# Patient Record
Sex: FEMALE | Race: WHITE | NOT HISPANIC OR LATINO | URBAN - METROPOLITAN AREA
[De-identification: names, ages, dates, MRNs, and addresses within clinical notes are randomized per-mention and may not be internally consistent; named-entity substitution may affect disease eponyms.]

---

## 2020-11-21 ENCOUNTER — EMERGENCY (EMERGENCY)
Facility: HOSPITAL | Age: 65
LOS: 1 days | Discharge: HOME | End: 2020-11-21
Attending: STUDENT IN AN ORGANIZED HEALTH CARE EDUCATION/TRAINING PROGRAM
Payer: MEDICARE

## 2020-11-21 VITALS
OXYGEN SATURATION: 100 % | RESPIRATION RATE: 20 BRPM | HEIGHT: 67 IN | TEMPERATURE: 98 F | DIASTOLIC BLOOD PRESSURE: 75 MMHG | SYSTOLIC BLOOD PRESSURE: 134 MMHG | HEART RATE: 67 BPM | WEIGHT: 162.04 LBS

## 2020-11-21 PROCEDURE — 93971 EXTREMITY STUDY: CPT | Mod: 26,LT

## 2020-11-21 PROCEDURE — 99284 EMERGENCY DEPT VISIT MOD MDM: CPT

## 2020-11-21 PROCEDURE — 93971 EXTREMITY STUDY: CPT

## 2020-11-21 PROCEDURE — 73630 X-RAY EXAM OF FOOT: CPT | Mod: 26,LT

## 2020-11-21 PROCEDURE — 99284 EMERGENCY DEPT VISIT MOD MDM: CPT | Mod: 25

## 2020-11-21 PROCEDURE — 73630 X-RAY EXAM OF FOOT: CPT

## 2020-11-21 NOTE — ED ADULT NURSE NOTE - OBJECTIVE STATEMENT
Pt c/o left foot pain x3 weeks, she states he had 2 runs of abx as per urgent care and pain continues. Mild redness and swelling noted, pedal pulses, weak but palpable. Pt has a hx of osteoarthritis and HLD. No other pertinent hx.

## 2020-11-21 NOTE — ED PROVIDER NOTE - PHYSICAL EXAMINATION
Constitutional - well-developed; well nourished. Head - NCAT. Airway patent. Eyes - PERRL. CV - RRR. no murmur. no edema. Pulm - CTAB. Abd - soft, nt. no rebound. no guarding. Neuro - A&Ox3. normal gait. Skin - No rash. MSK - normal ROM. L foot with mild ttp over mid lateral foot.  minimal erythema. no warmth.

## 2020-11-21 NOTE — ED PROVIDER NOTE - OBJECTIVE STATEMENT
Pt is a 64 yo F co L foot pain.  Pt states that 3 weeks ago she started to have pain and swelling to the L foot. Pt states that she went to an UC and was told it was an infection and started on abx. Pt states that it did not improve so a few days later she went to another UC and was switched to a stronger antibiotic. Pt states that the swelling has improved but the pain persists. Pt states that she is able to walk on it and certain shoes make it feel better. no fever/chills. no other complaints. no trauma.

## 2020-11-21 NOTE — ED PROVIDER NOTE - ATTENDING CONTRIBUTION TO CARE
I performed a face to face history and physical exam of the patient and discussed their management with the student/resident/ACP. I reviewed the student/resident/ACP's note and agree with the documented findings and plan of care.    XR and US negative. Pt reassured and instructed to f/up with podiatry. no concern for cellulitis at this time.

## 2020-11-21 NOTE — ED PROVIDER NOTE - CARE PROVIDER_API CALL
Sylvia Morris  PODIATRIC MEDICINE AND SURGERY  [address] [place] [address]  Phone: [phone]  Fax: [phone]  Follow Up Time:

## 2020-11-21 NOTE — ED ADULT NURSE NOTE - NSIMPLEMENTINTERV_GEN_ALL_ED
Implemented All Universal Safety Interventions:  [place] to call system. Call bell, personal items and telephone within reach. Instruct patient to call for assistance. Room bathroom lighting operational. Non-slip footwear when patient is off stretcher. Physically safe environment: no spills, clutter or unnecessary equipment. Stretcher in lowest position, wheels locked, appropriate side rails in place.

## 2020-11-21 NOTE — ED PROVIDER NOTE - PATIENT PORTAL LINK FT
You can access the FollowMyHealth Patient Portal offered by [facility] by registering at the following website: [email or web address]. By joining [facility]’s FollowMyHealth portal, you will also be able to view your health information using other applications (apps) compatible with our system.

## 2020-11-21 NOTE — ED ADULT TRIAGE NOTE - CHIEF COMPLAINT QUOTE
Left foot pain x 3 weeks, previously treated abx for supposed insect bite infection, pt reports minor relief and increased pain, denies injury

## 2020-11-21 NOTE — ED PROVIDER NOTE - CLINICAL SUMMARY MEDICAL DECISION MAKING FREE TEXT BOX
left foot redness swelling - pt finished course of antibiotics - no concern for infection - xray and US to r/o DVT, f/u with podiatry

## 2022-08-31 ENCOUNTER — OFFICE VISIT (OUTPATIENT)
Dept: NEUROLOGY | Age: 67
End: 2022-08-31
Payer: MEDICARE

## 2022-08-31 VITALS
HEIGHT: 67 IN | RESPIRATION RATE: 20 BRPM | WEIGHT: 185 LBS | SYSTOLIC BLOOD PRESSURE: 136 MMHG | HEART RATE: 64 BPM | DIASTOLIC BLOOD PRESSURE: 84 MMHG | OXYGEN SATURATION: 97 % | BODY MASS INDEX: 29.03 KG/M2

## 2022-08-31 DIAGNOSIS — R41.3 MEMORY DIFFICULTY: Primary | ICD-10-CM

## 2022-08-31 DIAGNOSIS — Z86.73 HX OF TRANSIENT ISCHEMIC ATTACK (TIA): ICD-10-CM

## 2022-08-31 PROCEDURE — G8432 DEP SCR NOT DOC, RNG: HCPCS | Performed by: PSYCHIATRY & NEUROLOGY

## 2022-08-31 PROCEDURE — 99205 OFFICE O/P NEW HI 60 MIN: CPT | Performed by: PSYCHIATRY & NEUROLOGY

## 2022-08-31 PROCEDURE — 3017F COLORECTAL CA SCREEN DOC REV: CPT | Performed by: PSYCHIATRY & NEUROLOGY

## 2022-08-31 PROCEDURE — G8536 NO DOC ELDER MAL SCRN: HCPCS | Performed by: PSYCHIATRY & NEUROLOGY

## 2022-08-31 PROCEDURE — 1090F PRES/ABSN URINE INCON ASSESS: CPT | Performed by: PSYCHIATRY & NEUROLOGY

## 2022-08-31 PROCEDURE — G8417 CALC BMI ABV UP PARAM F/U: HCPCS | Performed by: PSYCHIATRY & NEUROLOGY

## 2022-08-31 PROCEDURE — 1101F PT FALLS ASSESS-DOCD LE1/YR: CPT | Performed by: PSYCHIATRY & NEUROLOGY

## 2022-08-31 PROCEDURE — G8400 PT W/DXA NO RESULTS DOC: HCPCS | Performed by: PSYCHIATRY & NEUROLOGY

## 2022-08-31 PROCEDURE — 1123F ACP DISCUSS/DSCN MKR DOCD: CPT | Performed by: PSYCHIATRY & NEUROLOGY

## 2022-08-31 PROCEDURE — G8428 CUR MEDS NOT DOCUMENT: HCPCS | Performed by: PSYCHIATRY & NEUROLOGY

## 2022-08-31 RX ORDER — OMEPRAZOLE 20 MG/1
CAPSULE, DELAYED RELEASE ORAL
COMMUNITY
Start: 2022-06-13

## 2022-08-31 RX ORDER — PREDNISONE 20 MG/1
TABLET ORAL
COMMUNITY
Start: 2022-08-27

## 2022-08-31 RX ORDER — AZITHROMYCIN 250 MG/1
TABLET, FILM COATED ORAL
COMMUNITY
Start: 2022-08-28

## 2022-08-31 NOTE — PROGRESS NOTES
Jeannie Vu (1955) is a 78 y.o. female, new patient, here for evaluation of the following     Chief complaint(s):   Chief Complaint   Patient presents with    New Patient     Arthritis in her neck, hx of TIA- June 2021       HPI: 78 y.o. female with hx of TIA (June 2021), Polymyositis, Osteoarthritis    Patient moved from [place] to [place] in October November 2021, to be closer to her family. Today to establish care with neurology due to a prior history of TIA (June 2021), and to be evaluated for some mild memory changes. 1) TIA: pt describes that she had new onset of left-sided head numbness and felt dizzy. She says she went to see her PCP the next day and PCP told her to see a neurologist about symptoms. She brings a copy of related medical records dated 6/7/2021 [facility]). ER notes of that she presented with 4-5 day hx of left-sided headache, left facial pain, disequilibrium and feeling off balance, and reported a history of DVT in the left calf in the past.   CT brain with and without contrast was done. There is no evidence of hemorrhage, hydrocephalus, midline shift, or enhancing intracranial masses. The major dural venous sinuses were described as normal.  Patient was also noted to have small vessel ischemic changes. CTA brain was done and showed mild stenosis of the intracranial segments of the bilateral internal carotid arteries. There was no additional significant intracranial stenoses. She was described as ambulatory in the ER and having a non-focal neurologic exam. She was discharged home that day. She denies any further TIA or stroke-type symptoms. Is not taking daily aspirin or statin but was discharged on them. 2) Memory changes: she has started to notice some mild memory difficulty/ forgetfulness, sometimes can't remember why she went from one room to another. Sometimes can't remember what day it is.   She continues working,  for 2 different  companies. She bought house here and Son/ Family live upstairs and she lives downstairs. She continues managing her own finances, denies making any mistakes. She continues to give herself showers, cooks/ feeds herself. She drives without getting lost.     + FHx Dementia (mother, started with sx in late 70s/ early [de-identified]; maternal aunts have dementia)    3) Weakness when standing (separate complaint): pt says that when she tries to stand up on step stool, or if she's walking for longer than 15-20 minutes, her lower legs just feel tired. She has a hx of Polymyositis and had some lab test done on 6/15/2022 which showed a positive GENIE (1 in 320 titer) consistent with that diagnosis. She tells me she will be seeing a Rheumatologist here in [place] in 2022. Review of Systems: Anxiety, chest pain, may be dementia, may be depression, hearing problems, memory loss, movement disorder, question syncope, vision problems, muscle pain muscle weakness, joint pain, fatigue     ==================================================    Allergies   Allergen Reactions    Doxycycline Nausea Only     Headache, nausea, pain in her eyes        Current Outpatient Medications   Medication Sig Dispense Refill    azithromycin (ZITHROMAX) 250 mg tablet TAKE 2 TABLETS BY MOUTH TODAY, THEN TAKE 1 TABLET DAILY FOR 4 DAYS      predniSONE (DELTASONE) 20 mg tablet Only use as needed      omeprazole (PRILOSEC) 20 mg capsule TAKE 1 CAPSULE BY MOUTH EVERY MORNING BEFORE MEALS         Past Medical History:   Diagnosis Date    Osteoarthritis     Polymyositis ([facility] [address]        Past Surgical History:   Procedure Laterality Date    HX  SECTION      HX OTHER SURGICAL      removed some teeth       family history is not on file.        PHYSICAL EXAM    Vitals:    22 1415   BP: 136/84   BP 1 Location: Left upper arm   BP Patient Position: Sitting   BP Cuff Size: Adult   Pulse: 64   Resp: 20   Height: 5' 7\" (1.702 m)   Weight: 83.9 kg (185 lb)   SpO2: 97%       Awake alert resting calm conversant. EOMI. Hearing and speech are normal.  No dysarthria no aphasia. Visual fields are normal bilateral.  Shoulder shrug is symmetric. Face is symmetric. Intact light touch to both sides of face. Rheumatoid arthritic changes in both hands. 4+ out of 5 in both hands 5 out of 5 in the remainder of the arms. 5 out of 5 hip flexion and ankle dorsiflexion. Intact light touch in all extremities. No resting or postural tremors. Stands on own power. Gait appears normal.       ==================================================    ASSESSMENT/ PLAN:       ICD-10-CM ICD-9-CM    1. Memory difficulty  R41.3 780.93       2. Hx of transient ischemic attack (TIA)  Z86.73 V12.54            1) Memory difficulty: MMSE 30/ 30 today. Pt believes Dr Chase Patel had checked multiple labs during last visit there, including B12 and TSH and she believes these were normal.  D/w her that as she scored perfect on the MMSE today, I have no reason to believe she has any early symptoms of dementia. I offered to refer her for a complete neuropsychological test if she needed or desired more information. She was comfortable with today's findings and did not feel the need to pursue that test.    PT signed PILY so I can review her last clinic note/ labs from [facility]    2) Hx of TIA  I advised her to restart ASA 81 mg/day to reduce risk of future TIA or stroke. I d/w her NOT to restart the cholesterol medication as I don't want it to worsen her weakness (in setting of hx of polymyositis). 3) Hx of Polymyositis  Pt says that this was treated with steroids and IVIG in the past and \"cured. \" I d/w her that I'm not aware that Polymyositis is ever cured, but regardless, she should mention this diagnosis to Rheumatologist when she sees him/ her in the November for her RA diagnosis. 4) No active neurologic issue at this time.   Pt will schedule follow up if any new neurologic concerns. An electronic signature was used to authenticate this note.   -- Aletha Livingston MD

## 2022-08-31 NOTE — PROGRESS NOTES
Dementia is on her mothers side of the family   She is worried that she developing dementia   [place] the [place] so she is establishing care   TIA was back in 6/2021

## 2023-01-18 ENCOUNTER — ANESTHESIA EVENT (OUTPATIENT)
Dept: SURGERY | Age: 68
End: 2023-01-18
Payer: MEDICARE

## 2023-01-19 ENCOUNTER — APPOINTMENT (OUTPATIENT)
Dept: GENERAL RADIOLOGY | Age: 68
End: 2023-01-19
Attending: ORTHOPAEDIC SURGERY
Payer: MEDICARE

## 2023-01-19 ENCOUNTER — HOSPITAL ENCOUNTER (OUTPATIENT)
Age: 68
Setting detail: OUTPATIENT SURGERY
Discharge: HOME | End: 2023-01-19
Attending: ORTHOPAEDIC SURGERY | Admitting: ORTHOPAEDIC SURGERY
Payer: MEDICARE

## 2023-01-19 ENCOUNTER — ANESTHESIA (OUTPATIENT)
Dept: SURGERY | Age: 68
End: 2023-01-19
Payer: MEDICARE

## 2023-01-19 VITALS
BODY MASS INDEX: 28.25 KG/M2 | WEIGHT: 180 LBS | OXYGEN SATURATION: 97 % | HEART RATE: 62 BPM | TEMPERATURE: 97.9 F | HEIGHT: 67 IN | RESPIRATION RATE: 16 BRPM | DIASTOLIC BLOOD PRESSURE: 64 MMHG | SYSTOLIC BLOOD PRESSURE: 128 MMHG

## 2023-01-19 PROCEDURE — C1713 ANCHOR/SCREW BN/BN,TIS/BN: HCPCS | Performed by: ORTHOPAEDIC SURGERY

## 2023-01-19 PROCEDURE — 77030010507 HC ADH SKN DERMBND J&J -B

## 2023-01-19 PROCEDURE — 77030031139 HC SUT VCRL2 J&J -A: Performed by: ORTHOPAEDIC SURGERY

## 2023-01-19 PROCEDURE — 77030032758 HC BIT DRL DISP STRY -D: Performed by: ORTHOPAEDIC SURGERY

## 2023-01-19 PROCEDURE — 77030003899 HC BIT DRL TWST STRY -D: Performed by: ORTHOPAEDIC SURGERY

## 2023-01-19 PROCEDURE — 77030040361 HC SLV COMPR DVT MDII -B

## 2023-01-19 PROCEDURE — 74011250637 HC RX REV CODE- 250/637: Performed by: ORTHOPAEDIC SURGERY

## 2023-01-19 PROCEDURE — 76010000153 HC OR TIME 1.5 TO 2 HR: Performed by: ORTHOPAEDIC SURGERY

## 2023-01-19 PROCEDURE — 77030002916 HC SUT ETHLN J&J -A: Performed by: ORTHOPAEDIC SURGERY

## 2023-01-19 PROCEDURE — 74011250636 HC RX REV CODE- 250/636: Performed by: NURSE ANESTHETIST, CERTIFIED REGISTERED

## 2023-01-19 PROCEDURE — 77030003601 HC NDL NRV BLK BBMI -A

## 2023-01-19 PROCEDURE — 74011250636 HC RX REV CODE- 250/636: Performed by: ANESTHESIOLOGY

## 2023-01-19 PROCEDURE — 76210000020 HC REC RM PH II FIRST 0.5 HR: Performed by: ORTHOPAEDIC SURGERY

## 2023-01-19 PROCEDURE — 77030040922 HC BLNKT HYPOTHRM STRY -A

## 2023-01-19 PROCEDURE — C1769 GUIDE WIRE: HCPCS | Performed by: ORTHOPAEDIC SURGERY

## 2023-01-19 PROCEDURE — 74011000250 HC RX REV CODE- 250: Performed by: ANESTHESIOLOGY

## 2023-01-19 PROCEDURE — 76210000006 HC OR PH I REC 0.5 TO 1 HR: Performed by: ORTHOPAEDIC SURGERY

## 2023-01-19 PROCEDURE — 74011250636 HC RX REV CODE- 250/636: Performed by: ORTHOPAEDIC SURGERY

## 2023-01-19 PROCEDURE — 77030002933 HC SUT MCRYL J&J -A: Performed by: ORTHOPAEDIC SURGERY

## 2023-01-19 PROCEDURE — 2709999900 HC NON-CHARGEABLE SUPPLY: Performed by: ORTHOPAEDIC SURGERY

## 2023-01-19 PROCEDURE — 77030013837 HC NERV BLK KT BBMI -B

## 2023-01-19 PROCEDURE — 74011000250 HC RX REV CODE- 250: Performed by: ORTHOPAEDIC SURGERY

## 2023-01-19 PROCEDURE — 74011000250 HC RX REV CODE- 250: Performed by: NURSE ANESTHETIST, CERTIFIED REGISTERED

## 2023-01-19 PROCEDURE — 76060000034 HC ANESTHESIA 1.5 TO 2 HR: Performed by: ORTHOPAEDIC SURGERY

## 2023-01-19 DEVICE — ONE-THIRD TUBULAR PLATE: Type: IMPLANTABLE DEVICE | Site: FOOT | Status: FUNCTIONAL

## 2023-01-19 DEVICE — CANNULATED SCREW
Type: IMPLANTABLE DEVICE | Site: FOOT | Status: FUNCTIONAL
Brand: ASNIS

## 2023-01-19 DEVICE — BONE SCREW
Type: IMPLANTABLE DEVICE | Site: FOOT | Status: FUNCTIONAL
Brand: VARIAX

## 2023-01-19 DEVICE — DISTAL LATERAL FIBULA PLATE, 5 HOLE
Type: IMPLANTABLE DEVICE | Site: FOOT | Status: FUNCTIONAL
Brand: VARIAX

## 2023-01-19 DEVICE — LOCKING SCREW
Type: IMPLANTABLE DEVICE | Site: FOOT | Status: FUNCTIONAL
Brand: VARIAX

## 2023-01-19 RX ORDER — EPHEDRINE SULFATE/0.9% NACL/PF 50 MG/5 ML
SYRINGE (ML) INTRAVENOUS AS NEEDED
Status: DISCONTINUED | OUTPATIENT
Start: 2023-01-19 | End: 2023-01-19 | Stop reason: HOSPADM

## 2023-01-19 RX ORDER — MIDAZOLAM HYDROCHLORIDE 1 MG/ML
INJECTION, SOLUTION INTRAMUSCULAR; INTRAVENOUS AS NEEDED
Status: DISCONTINUED | OUTPATIENT
Start: 2023-01-19 | End: 2023-01-19 | Stop reason: HOSPADM

## 2023-01-19 RX ORDER — DIPHENHYDRAMINE HYDROCHLORIDE 50 MG/ML
12.5 INJECTION, SOLUTION INTRAMUSCULAR; INTRAVENOUS AS NEEDED
Status: DISCONTINUED | OUTPATIENT
Start: 2023-01-19 | End: 2023-01-19 | Stop reason: HOSPADM

## 2023-01-19 RX ORDER — ROPIVACAINE HYDROCHLORIDE 5 MG/ML
INJECTION, SOLUTION EPIDURAL; INFILTRATION; PERINEURAL AS NEEDED
Status: DISCONTINUED | OUTPATIENT
Start: 2023-01-19 | End: 2023-01-19 | Stop reason: HOSPADM

## 2023-01-19 RX ORDER — OXYCODONE HYDROCHLORIDE 5 MG/1
5 CAPSULE ORAL
COMMUNITY

## 2023-01-19 RX ORDER — LIDOCAINE HYDROCHLORIDE 20 MG/ML
INJECTION, SOLUTION EPIDURAL; INFILTRATION; INTRACAUDAL; PERINEURAL AS NEEDED
Status: DISCONTINUED | OUTPATIENT
Start: 2023-01-19 | End: 2023-01-19 | Stop reason: HOSPADM

## 2023-01-19 RX ORDER — HYDROMORPHONE HYDROCHLORIDE 1 MG/ML
.25-1 INJECTION, SOLUTION INTRAMUSCULAR; INTRAVENOUS; SUBCUTANEOUS
Status: DISCONTINUED | OUTPATIENT
Start: 2023-01-19 | End: 2023-01-19 | Stop reason: HOSPADM

## 2023-01-19 RX ORDER — SODIUM CHLORIDE, SODIUM LACTATE, POTASSIUM CHLORIDE, CALCIUM CHLORIDE 600; 310; 30; 20 MG/100ML; MG/100ML; MG/100ML; MG/100ML
125 INJECTION, SOLUTION INTRAVENOUS CONTINUOUS
Status: DISCONTINUED | OUTPATIENT
Start: 2023-01-19 | End: 2023-01-19 | Stop reason: HOSPADM

## 2023-01-19 RX ORDER — FENTANYL CITRATE 50 UG/ML
INJECTION, SOLUTION INTRAMUSCULAR; INTRAVENOUS AS NEEDED
Status: DISCONTINUED | OUTPATIENT
Start: 2023-01-19 | End: 2023-01-19 | Stop reason: HOSPADM

## 2023-01-19 RX ORDER — PROPOFOL 10 MG/ML
INJECTION, EMULSION INTRAVENOUS
Status: DISCONTINUED | OUTPATIENT
Start: 2023-01-19 | End: 2023-01-19 | Stop reason: HOSPADM

## 2023-01-19 RX ORDER — SUCCINYLCHOLINE CHLORIDE 20 MG/ML
INJECTION INTRAMUSCULAR; INTRAVENOUS AS NEEDED
Status: DISCONTINUED | OUTPATIENT
Start: 2023-01-19 | End: 2023-01-19 | Stop reason: HOSPADM

## 2023-01-19 RX ORDER — ONDANSETRON 2 MG/ML
4 INJECTION INTRAMUSCULAR; INTRAVENOUS AS NEEDED
Status: DISCONTINUED | OUTPATIENT
Start: 2023-01-19 | End: 2023-01-19 | Stop reason: HOSPADM

## 2023-01-19 RX ORDER — LIDOCAINE HYDROCHLORIDE 10 MG/ML
0.1 INJECTION, SOLUTION EPIDURAL; INFILTRATION; INTRACAUDAL; PERINEURAL AS NEEDED
Status: DISCONTINUED | OUTPATIENT
Start: 2023-01-19 | End: 2023-01-19 | Stop reason: HOSPADM

## 2023-01-19 RX ORDER — GABAPENTIN 300 MG/1
300 CAPSULE ORAL
Status: COMPLETED | OUTPATIENT
Start: 2023-01-19 | End: 2023-01-19

## 2023-01-19 RX ORDER — ENOXAPARIN SODIUM 100 MG/ML
40 INJECTION SUBCUTANEOUS
COMMUNITY
End: 2023-01-19

## 2023-01-19 RX ORDER — ESCITALOPRAM OXALATE 10 MG/1
10 TABLET ORAL DAILY
COMMUNITY

## 2023-01-19 RX ORDER — ONDANSETRON 2 MG/ML
INJECTION INTRAMUSCULAR; INTRAVENOUS AS NEEDED
Status: DISCONTINUED | OUTPATIENT
Start: 2023-01-19 | End: 2023-01-19 | Stop reason: HOSPADM

## 2023-01-19 RX ORDER — ACETAMINOPHEN 325 MG/1
650 TABLET ORAL
Status: COMPLETED | OUTPATIENT
Start: 2023-01-19 | End: 2023-01-19

## 2023-01-19 RX ORDER — HYDROXYCHLOROQUINE SULFATE 200 MG/1
200 TABLET, FILM COATED ORAL DAILY
COMMUNITY

## 2023-01-19 RX ORDER — SODIUM CHLORIDE, SODIUM LACTATE, POTASSIUM CHLORIDE, CALCIUM CHLORIDE 600; 310; 30; 20 MG/100ML; MG/100ML; MG/100ML; MG/100ML
75 INJECTION, SOLUTION INTRAVENOUS CONTINUOUS
Status: DISCONTINUED | OUTPATIENT
Start: 2023-01-19 | End: 2023-01-19 | Stop reason: HOSPADM

## 2023-01-19 RX ORDER — SIMETHICONE 80 MG
80 TABLET,CHEWABLE ORAL
COMMUNITY

## 2023-01-19 RX ORDER — HYDROMORPHONE HYDROCHLORIDE 2 MG/ML
INJECTION, SOLUTION INTRAMUSCULAR; INTRAVENOUS; SUBCUTANEOUS AS NEEDED
Status: DISCONTINUED | OUTPATIENT
Start: 2023-01-19 | End: 2023-01-19 | Stop reason: HOSPADM

## 2023-01-19 RX ORDER — PROPOFOL 10 MG/ML
INJECTION, EMULSION INTRAVENOUS AS NEEDED
Status: DISCONTINUED | OUTPATIENT
Start: 2023-01-19 | End: 2023-01-19 | Stop reason: HOSPADM

## 2023-01-19 RX ORDER — MELATONIN 5 MG
CAPSULE ORAL
COMMUNITY

## 2023-01-19 RX ORDER — IBUPROFEN 200 MG
1 TABLET ORAL EVERY 24 HOURS
COMMUNITY
End: 2023-01-19

## 2023-01-19 RX ADMIN — Medication 10 MG: at 14:55

## 2023-01-19 RX ADMIN — ONDANSETRON HYDROCHLORIDE 4 MG: 2 SOLUTION INTRAMUSCULAR; INTRAVENOUS at 14:42

## 2023-01-19 RX ADMIN — GABAPENTIN 300 MG: 300 CAPSULE ORAL at 13:07

## 2023-01-19 RX ADMIN — PROPOFOL 150 MG: 10 INJECTION, EMULSION INTRAVENOUS at 14:29

## 2023-01-19 RX ADMIN — FENTANYL CITRATE 50 MCG: 50 INJECTION, SOLUTION INTRAMUSCULAR; INTRAVENOUS at 13:21

## 2023-01-19 RX ADMIN — SODIUM CHLORIDE 40 MCG: 9 INJECTION, SOLUTION INTRAVENOUS at 15:29

## 2023-01-19 RX ADMIN — MIDAZOLAM HYDROCHLORIDE 2 MG: 1 INJECTION, SOLUTION INTRAMUSCULAR; INTRAVENOUS at 13:21

## 2023-01-19 RX ADMIN — PROPOFOL 120 MCG/KG/MIN: 10 INJECTION, EMULSION INTRAVENOUS at 14:29

## 2023-01-19 RX ADMIN — ACETAMINOPHEN 650 MG: 325 TABLET ORAL at 13:07

## 2023-01-19 RX ADMIN — SUCCINYLCHOLINE CHLORIDE 60 MG: 20 INJECTION, SOLUTION INTRAMUSCULAR; INTRAVENOUS at 14:29

## 2023-01-19 RX ADMIN — Medication 10 MG: at 14:41

## 2023-01-19 RX ADMIN — BUPIVACAINE HYDROCHLORIDE 10 ML/HR: 7.5 INJECTION, SOLUTION EPIDURAL; RETROBULBAR at 17:11

## 2023-01-19 RX ADMIN — HYDROMORPHONE HYDROCHLORIDE 1 MG: 2 INJECTION, SOLUTION INTRAMUSCULAR; INTRAVENOUS; SUBCUTANEOUS at 14:48

## 2023-01-19 RX ADMIN — PROPOFOL 50 MG: 10 INJECTION, EMULSION INTRAVENOUS at 14:46

## 2023-01-19 RX ADMIN — FENTANYL CITRATE 50 MCG: 50 INJECTION, SOLUTION INTRAMUSCULAR; INTRAVENOUS at 13:24

## 2023-01-19 RX ADMIN — SODIUM CHLORIDE 40 MCG: 9 INJECTION, SOLUTION INTRAVENOUS at 15:59

## 2023-01-19 RX ADMIN — LIDOCAINE HYDROCHLORIDE 40 MG: 20 INJECTION, SOLUTION EPIDURAL; INFILTRATION; INTRACAUDAL; PERINEURAL at 14:29

## 2023-01-19 RX ADMIN — SODIUM CHLORIDE, POTASSIUM CHLORIDE, SODIUM LACTATE AND CALCIUM CHLORIDE: 600; 310; 30; 20 INJECTION, SOLUTION INTRAVENOUS at 14:01

## 2023-01-19 RX ADMIN — Medication 10 MG: at 15:23

## 2023-01-19 RX ADMIN — Medication 10 MG: at 15:00

## 2023-01-19 RX ADMIN — ROPIVACAINE HYDROCHLORIDE 10 ML: 5 INJECTION, SOLUTION EPIDURAL; INFILTRATION; PERINEURAL at 13:41

## 2023-01-19 RX ADMIN — PROPOFOL 100 MG: 10 INJECTION, EMULSION INTRAVENOUS at 14:48

## 2023-01-19 RX ADMIN — ROPIVACAINE HYDROCHLORIDE 30 ML: 5 INJECTION, SOLUTION EPIDURAL; INFILTRATION; PERINEURAL at 13:31

## 2023-01-19 RX ADMIN — CEFAZOLIN SODIUM 2 G: 1 POWDER, FOR SOLUTION INTRAMUSCULAR; INTRAVENOUS at 14:35

## 2023-01-19 NOTE — H&P
Date of Surgery Update:  Santana Jane was seen and examined. History and physical has been reviewed. The patient has been examined. There have been no significant clinical changes since the completion of the originally dated History and Physical.  Patient identified by surgeon; surgical site was confirmed by patient and surgeon. Full paper H&P on chart    Signed By: Ruben Box.  Toney Andrea MD     January 19, 2023 12:22 PM

## 2023-01-19 NOTE — PERIOP NOTES
TRANSFER - OUT REPORT:    Verbal report given to Danni Mays RN (name) on Adam Stubbs  being transferred to [facility] (unit) for routine post - op and discharge. Report consisted of patients Situation, Background, Assessment and   Recommendations(SBAR). Information from the following report(s) SBAR, Kardex, OR Summary, Procedure Summary, Intake/Output, MAR, Accordion, Recent Results, Med Rec Status, and Cardiac Rhythm NSR  was reviewed with the receiving nurse. Lines:   Peripheral IV 01/19/23 Anterior;Distal;Left Forearm (Active)   Site Assessment Clean, dry, & intact 01/19/23 1624   Phlebitis Assessment 0 01/19/23 1624   Infiltration Assessment 0 01/19/23 1624   Dressing Status Clean, dry, & intact 01/19/23 1624   Dressing Type Tape;Transparent 01/19/23 1624   Hub Color/Line Status Pink; Infusing;Patent 01/19/23 1624   Action Taken Open ports on tubing capped 01/19/23 1624   Alcohol Cap Used Yes 01/19/23 1624        Opportunity for questions and clarification was provided.       Patient transported with:   Discharge Instructions, Prescriptions, On-Q Lashonda Savage

## 2023-01-19 NOTE — ANESTHESIA PREPROCEDURE EVALUATION
Relevant Problems   No relevant active problems       Anesthetic History   No history of anesthetic complications            Review of Systems / Medical History  Patient summary reviewed, nursing notes reviewed and pertinent labs reviewed    Pulmonary  Within defined limits                 Neuro/Psych   Within defined limits           Cardiovascular  Within defined limits                     GI/Hepatic/Renal  Within defined limits              Endo/Other        Arthritis     Other Findings   Comments: RA and LUPUS           Physical Exam    Airway  Mallampati: I  TM Distance: 4 - 6 cm  Neck ROM: normal range of motion   Mouth opening: Normal     Cardiovascular    Rhythm: regular  Rate: normal         Dental    Dentition: Full upper dentures     Pulmonary  Breath sounds clear to auscultation               Abdominal         Other Findings            Anesthetic Plan    ASA: 2  Anesthesia type: general      Post-op pain plan if not by surgeon: peripheral nerve block continuous    Induction: Intravenous  Anesthetic plan and risks discussed with: Patient

## 2023-01-19 NOTE — BRIEF OP NOTE
Brief Postoperative Note    Patient: Corina Skelton  YOB: 1955  MRN: [id number]    Date of Procedure: 1/19/2023     Pre-Op Diagnosis: CLOSED DISPLACED TRIMALLEOLAR FRACTURE RIGHT ANKLE    Post-Op Diagnosis: Same as preoperative diagnosis. Procedure(s):  REMOVAL RIGHT LEG EXTERNAL FIXATOR  OPEN REDUCTION INTERNAL FIXATION RIGHT TRIMALLEOLAR ANKLE FRACTURE    INDEPENDENT INTERPRETATION OF INTRA-OPERATIVE FLUOROSCOPY      Surgeon(s): Velasquez Franco MD    Surgical Assistant: During the procedure Cynthia Moya PA-C performed the duties of positioning, retraction, assistance with limb and implant management as well as closure and dressing and splint placement      Anesthesia: regional w/ general     Estimated Blood Loss (mL): less than 5cc     Complications: None    Specimens: * No specimens in log *     Implants:   Implant Name Type Inv.  Item Serial No.  Lot No. LRB No. Used Action   PLATE BNE [id number] 4 H NONSTERILE 1 3RD TBLR - SNA  PLATE BNE [id number] 4 H NONSTERILE 1 3RD TBLR NA [employer] [facility]_ NA Right 1 Implanted   PLATE BNE FIB DSTL LAT 5H -- [employer]IAX FT - SNA  PLATE BNE FIB DSTL LAT 5H -- [employer] FT NA [employer] HOW_ NA Right 1 Implanted   SCREW BNE L36MM DIA4MM TI ALLY SELF DRL ST ARTEM PARTIALLY - SNA  SCREW BNE L36MM DIA4MM TI ALLY SELF DRL ST ARTEM PARTIALLY NA [employer] HOW_ NA Right 1 Implanted   SCR BNE ARTEM 4X38MM TI -- ASNIS III - SNA  SCR BNE ARTEM 4X38MM TI -- ASNIS III NA [employer] [facility] 1 Implanted   SCR BNE ARTEM 4X40MM PT TI -- ASNIS III - SNA  SCR BNE ARTEM 4X40MM PT TI -- ASNIS III NA [employer] HOW_ NA Right 1 Implanted   SCR BNE T10 FT 3.5X12MM -- VARIAX - SNA  SCR BNE T10 FT 3.5X12MM -- [employer] NA [employer] HOW_ NA Right 3 Implanted   SCREW BNE L14MM DIA3.5MM ARASH TI ST NONLOCKING FULL THRD - SNA  SCREW BNE L14MM DIA3.5MM ARASH TI ST NONLOCKING FULL THRD NA [employer] HOWM_WD NA Right 1 Implanted   SCR BNE T10 FT 3.5X22MM -- [employer]X - SNA  SCR BNE T10 FT 3.5X22MM -- [employer] NA [employer] [facility] Right 1 Implanted   SCR BNE T10 FT 3.5X28MM -- VARIAX - SNA  SCR BNE T10 FT 3.5X28MM -- [employer] NA [employer] [facility] NA Right 1 Implanted   SCR BNE T10 FT 3.5X32MM -- [employer]IA[employer] - SNA  SCR BNE T10 FT 3.5X32MM -- [employer] NA [employer] [facility] NA Right 1 Implanted   SCR BNE T10 FT 3.5X34MM -- [employer] - SNA  SCR BNE T10 FT 3.5X34MM -- [employer] NA [employer] [facility] NA Right 1 Implanted     Tito plate/screws  [place] 4.0mm cannulated screws x 3    Drains: * No LDAs found *    Findings:     CLOSED DISPLACED TRIMALLEOLAR FRACTURE RIGHT ANKLE      TT: hemaclear calf x 100 min    Electronically Signed by Dora Gimenez.  MD Nilsa on 1/19/2023 at 11:04 AM

## 2023-01-19 NOTE — ANESTHESIA POSTPROCEDURE EVALUATION
Procedure(s):  REMOVAL RIGHT LEG EXTERNAL FIXATOR, OPEN REDUCTION INTERNAL FIXATION RIGHT TRIMALLEOLAR ANKLE FRACTURE. general    Anesthesia Post Evaluation        Patient location during evaluation: PACU  Level of consciousness: awake  Pain management: adequate  Airway patency: patent  Anesthetic complications: no  Cardiovascular status: acceptable  Respiratory status: acceptable  Hydration status: acceptable  Post anesthesia nausea and vomiting:  none      INITIAL Post-op Vital signs:   Vitals Value Taken Time   /69 01/19/23 1655   Temp 36.6 °C (97.9 °F) 01/19/23 1624   Pulse 66 01/19/23 1700   Resp 13 01/19/23 1700   SpO2 97 % 01/19/23 1700   Vitals shown include unvalidated device data.

## 2023-01-19 NOTE — DISCHARGE INSTRUCTIONS
DISCHARGE SUMMARY from your Nurse      PATIENT INSTRUCTIONS    After general anesthesia or intravenous sedation, for 24 hours or while taking prescription Narcotics:  Limit your activities  Do not drive and operate hazardous machinery  Do not make important personal or business decisions  Do  not drink alcoholic beverages  If you have not urinated within 8 hours after discharge, please contact your surgeon on call. Report the following to your surgeon:  Excessive pain, swelling, redness or odor of or around the surgical area  Temperature over 100.5  Nausea and vomiting lasting longer than 4 hours or if unable to take medications  Any signs of decreased circulation or nerve impairment to extremity: change in color, persistent  numbness, tingling, coldness or increase pain  Any questions      GOOD HELP TO FIGHT AN INFECTION  Here are a few tip to help reduce the chance of getting an infection after surgery:  Wash Your Hands  Good handwashing is the most important thing you and your caregiver can do. Wash before and after caring for any wounds. Dry your hand with a clean towel. Wash with soap and water for at least 20 seconds. A TIP: sing the \"Happy Birthday\" song through one time while washing to help with the timing. Use a hand  in between washings. Shower  When your surgeon says it is OK to take a shower, use a new bar of antibacterial soap (if that is what you use, and keep that bar of soap ONLY for your use), or antibacterial body wash. Use a clean wash cloth or sponge when you bathe. Dry off with a clean towel  after every bath - be careful around any wounds, skin staples, sutures or surgical glue over/on wounds. Do not enter swimming pools, hot tubs, lakes, rivers and/or ocean until wounds are healed and your doctor/surgeon says it is OK. Use Clean Sheets  Sleep on freshly laundered sheets after your surgery.   Keep the surgery site covered with a clean, dry bandage (if instructed to do so).  If the bandage becomes soiled, reapply a new, dry, clean bandage. Do not allow pets to sleep with you while your wound is healing. Lifestyle Modification and Controlling Your Blood Sugar  Smoking slows wound healing. Stop smoking and limit exposure to second-hand smoke. High blood sugar slows wound healing. Eat a well-balanced diet to provide proper nutrition while healing  Monitor your blood sugar (if you are a diabetic) and take your medications as you are suppose to so you can control you blood sugar after surgery. COUGH AND DEEP BREATHE    Breathing deeply and coughing are very important exercises to do after surgery. Deep breathing and coughing open the little air tubes and air sacks in your lungs. You take deep breaths every day. You may not even notice - it is just something you do when you sigh or yawn. It is a natural exercise you do to keep these air passages open. After surgery, take deep breaths and cough, on purpose. DIRECTIONS:  Take 10 to 15 slow deep breaths every hour while awake. Breathe in deeply, and hold it for 2 seconds. Exhale slowly through puckered lips, like blowing up a balloon. After every 4th or 5th deep breath, hug your pillow to your chest or belly and give a hard, deep cough. Yes, it will probably hurt. But doing this exercise is a very important part of healing after surgery. Take your pain medicine to help you do this exercise without too much pain. Coughing and deep breathing help prevent bronchitis and pneumonia after surgery. If you had chest or belly surgery, use a pillow as a \"hug peterson\" and hold it tightly to your chest or belly when you cough. ANKLE PUMPS    Ankle pumps increase the circulation of oxygenated blood to your lower extremities and decrease your risk for circulation problems such as blood clots.  They also stretch the muscles, tendons and ligaments in your foot and ankle, and prevent joint contracture in the ankle and foot, especially after surgeries on the legs. It is important to do ankle pump exercises regularly after surgery because immobility increases your risk for developing a blood clot. Your doctor may also have you take an Aspirin for the next few days as well. If your doctor did not ask you to take an Aspirin, consult with him before starting Aspirin therapy on your own. The exercise is quite simple. Slowly point your foot forward, feeling the muscles on the top of your lower leg stretch, and hold this position for 5 seconds. Next, pull your foot back toward you as far as possible, stretching the calf muscles, and hold that position for 5 seconds. Repeat with the other foot. Perform 10 repetitions every hour while awake for both ankles if possible (down and then up with the foot once is one repetition). You should feel gentle stretching of the muscles in your lower leg when doing this exercise. If you feel pain, or your range of motion is limited, don't push too hard. Only go the limit your joint and muscles will let you go. If you have increasing pain, progressively worsening leg warmth or swelling, STOP the exercise and call your doctor. MEDICATION AND   SIDE EFFECT GUIDE    The Trent Leung MEDICATION AND SIDE EFFECT GUIDE was provided to the PATIENT AND CARE PROVIDER. Information provided includes instruction about drug purpose and common side effects for the following medications:   Medications prescribed by Dr. Ranulfo Garzon        These are general instructions for a healthy lifestyle:    *   Please give a list of your current medications to your Primary Care Provider. *   Please update this list whenever your medications are discontinued, doses are changed, or new medications (including over-the-counter products) are added. *   Please carry medication information at all times in case of emergency situations.       About Smoking  No smoking / No tobacco products  Avoid exposure to second hand smoke     Surgeon General's Warning:  Quitting smoking now greatly reduces serious risk to your health. Obesity, smoking, and sedentary lifestyle greatly increases your risk for illness and disease. A healthy diet, regular physical exercise & weight monitoring are important for maintaining a healthy lifestyle. Congestive Heart Failure  You may be retaining fluid if you have a history of heart failure or if you experience any of the following symptoms:  Weight gain of 3 pounds or more overnight or 5 pounds in a week, increased swelling in your hands or feet or shortness of breath while lying flat in bed. Please call your doctor as soon as you notice any of these symptoms; do not wait until your next office visit. Recognize signs and symptoms of STROKE:  F -  Face looks uneven  A -  Arms unable to move or move evenly  S -  Speech slurred or non-existent  T -  Time-call 911 as soon as signs and symptoms begin-DO NOT go          back to bed or wait to see if you get better-TIME IS BRAIN. Warning Signs of HEART ATTACK   Call 911 if you have these symptoms:    Chest discomfort. Most heart attacks involve discomfort in the center of the chest that lasts more than a few minutes, or that goes away and comes back. It can feel like uncomfortable pressure, squeezing, fullness, or pain. Discomfort in other areas of the upper body. Symptoms can include pain or discomfort in one or both arms, the back, neck, jaw, or stomach. Shortness of breath with or without chest discomfort. Other signs may include breaking out in a cold sweat, nausea, or lightheadedness. Don't wait more than five minutes to call 911 - MINUTES MATTER! Fast action can save your life. Calling 911 is almost always the fastest way to get lifesaving treatment.  Emergency Medical Services staff can begin treatment when they arrive -- up to an hour sooner than if someone gets to the hospital by car. Learning About Coronavirus [phone])  Coronavirus [phone]): Overview  What is coronavirus (COVID-19)? The coronavirus disease (COVID-19) is caused by a virus. It is an illness that was first [facility]. It has since spread worldwide. The virus can cause fever, cough, and trouble breathing. In severe cases, it can cause pneumonia and make it hard to breathe without help. It can cause death. Coronaviruses are a large group of viruses. They cause the common cold. They also cause more serious illnesses like Middle East respiratory syndrome (MERS) and severe acute respiratory syndrome (SARS). COVID-19 is caused by a novel coronavirus. That means it's a new type that has not been seen in people before. This virus spreads person-to-person through droplets from coughing and sneezing. It can also spread when you are close to someone who is infected. And it can spread when you touch something that has the virus on it, such as a doorknob or a tabletop. What can you do to protect yourself from coronavirus (COVID-19)? The best way to protect yourself from getting sick is to: Avoid areas where there is an outbreak. Avoid contact with people who may be infected. Wash your hands often with soap or alcohol-based hand sanitizers. Avoid crowds and try to stay at least 6 feet away from other people. Wash your hands often, especially after you cough or sneeze. Use soap and water, and scrub for at least 20 seconds. If soap and water aren't available, use an alcohol-based hand . Avoid touching your mouth, nose, and eyes. What can you do to avoid spreading the virus to others? To help avoid spreading the virus to others:  Cover your mouth with a tissue when you cough or sneeze. Then throw the tissue in the trash. Use a disinfectant to clean things that you touch often. Stay home if you are sick or have been exposed to the virus.  Don't go to school, work, or public areas. And don't use public transportation. If you are sick:  Leave your home only if you need to get medical care. But call the doctor's office first so they know you're coming. And wear a face mask, if you have one. If you have a face mask, wear it whenever you're around other people. It can help stop the spread of the virus when you cough or sneeze. Clean and disinfect your home every day. Use household  and disinfectant wipes or sprays. Take special care to clean things that you grab with your hands. These include doorknobs, remote controls, phones, and handles on your refrigerator and microwave. And don't forget countertops, tabletops, bathrooms, and computer keyboards. When to call for help  Call 911 anytime you think you may need emergency care. For example, call if:  You have severe trouble breathing. (You can't talk at all.)  You have constant chest pain or pressure. You are severely dizzy or lightheaded. You are confused or can't think clearly. Your face and lips have a blue color. You pass out (lose consciousness) or are very hard to wake up. Call your doctor now if you develop symptoms such as:  Shortness of breath. Fever. Cough. If you need to get care, call ahead to the doctor's office for instructions before you go. Make sure you wear a face mask, if you have one, to prevent exposing other people to the virus. Where can you get the latest information? The following health organizations are tracking and studying this virus. Their websites contain the most up-to-date information. Babar Mueller also learn what to do if you think you may have been exposed to the virus. U.S. Centers for Disease Control and Prevention (CDC): The CDC provides updated news about the disease and travel advice. The website also tells you how to prevent the spread of infection. www.cdc.gov  World Health Organization [facility]): WHO offers information about the virus outbreaks. WHO also has travel advice. www.who.int  Current as of: April 1, 2020               Content Version: 12.4  © 4536-1312 [employer]. Care instructions adapted under license by your healthcare professional. If you have questions about a medical condition or this instruction, always ask your healthcare professional. [address] any warranty or liability for your use of this information. The discharge information has been reviewed with the patient and caregiver. Any questions and concerns from the patient and caregiver have been addressed. The patient and caregiver verbalized understanding. CONTENTS FOUND IN YOUR DISCHARGE ENVELOPE:  [x]     Surgeon and Hospital Discharge Instructions  [x]     [facility] Care Provider Card  []     Medication & Side Effect Guide            (your newly prescribed medications have been marked/highlighted showing the most common side effects from   the classes of drugs on your prescriptions)  [x]     Medication Prescription(s) x Medications prescribed by Dr. Emmanuelle Harley ( [] These have been sent electronically to your pharmacy by your surgeon,   - OR -       your surgeon has already provided these to you during a previous/pre-op office visit)  []     [address]  []     Physical Therapy Prescription  []     Follow-up Appointment Cards  []     Surgery-related Pictures/Media  [x]     Pain block and/or block with On-Q Catheter from Anesthesia Service (information included in your instructions above)  []     Medical device information sheets/pamphlets from their    []     School/work excuse note. []     /parent work excuse note. The following personal items collected during your admission are returned to you:   Dental Appliance: Dental Appliances: Uppers  Vision: Visual Aid: Glasses  Hearing Aid:    Jewelry: Jewelry: None  Clothing: Clothing: Other (comment) (street clothes to locker)  Other Valuables:  Other Valuables: Cell Phone, Cigarettes, Wheelchair (in belongings bag)  Valuables sent to safe: Personal Items Sent to Safe: none

## 2023-01-19 NOTE — PERIOP NOTES
Spoke with Taya Lucio at [email or web address]. [facility] will not accept the patient with the On-Q Ball. Called and spoke with Dr. Megan Mcnamara. Dr. Megan Mcnamara ordered to [facility] and send patient back to the [facility] as patient does not meet admission criteria. Spoke with Taya Lucio at the [email or web address] and Taya Lucio stated that per their Nursing Director Favio Markham, they will accept patient if Dawson Mendoza is Dc'd. Will proceed with dc of On-Q ball and [place] [facility]

## 2023-01-19 NOTE — ANESTHESIA PROCEDURE NOTES
Peripheral Block    Start time: 1/19/2023 1:21 PM  End time: 1/19/2023 1:41 PM  Performed by: Gene Andrews MD  Authorized by: Gene Andrews MD       Pre-procedure: Indications: at surgeon's request and post-op pain management    Preanesthetic Checklist: patient identified, risks and benefits discussed, site marked, timeout performed, anesthesia consent given, patient being monitored and fire risk safety assessment completed and verbalized    Timeout Time: 13:21 EST      Block Type:   Block Type:  Popliteal and saphenous  Laterality:  Right  Monitoring:  Continuous pulse ox, frequent vital sign checks, heart rate, responsive to questions and oxygen  Injection Technique:  Continuous  Procedures: ultrasound guided    Patient Position: supine  Prep: chlorhexidine    Location:  Upper thigh  Needle Type:  Tuohy  Needle Gauge:  18 G  Needle Localization:  Ultrasound guidance  Med Admin Time: 1/19/2023 1:41 PM    Assessment:  Number of attempts:  1  Injection Assessment:  Incremental injection every 5 mL, local visualized surrounding nerve on ultrasound, negative aspiration for blood, no paresthesia and no intravascular symptoms  Patient tolerance:  Patient tolerated the procedure well with no immediate complications  Saphenous block performed with ultrasound guidance; 4\" stimuplex 21g needle used, 10cc 0.5% ropivacaine injected slowly with intermittent aspiration. Michelle ALBA RN witnessed time out and block written on correct side.

## 2023-01-20 NOTE — OP NOTES
[facility]  OPERATIVE REPORT    Name:  Amilcar Kelley  MR#:  [id number]  :  1955  ACCOUNT #:  [de-identified]  DATE OF SERVICE:  2023    PREOPERATIVE DIAGNOSIS:  Closed displaced trimalleolar ankle fracture. POSTOPERATIVE DIAGNOSIS:  Closed displaced trimalleolar ankle fracture. PROCEDURE PERFORMED:  1. Removal of right leg external fixator. 2.  Open reduction and internal fixation of right trimalleolar ankle fracture. 3.  Independent interpretation of intraoperative fluoroscopy. SURGEON:  Kiesha Hernández. Efren Palacio MD    ASSISTANT:  Brunilda Niño PA-C    ANESTHESIA:  Regional with general.    COMPLICATIONS:  None. SPECIMENS REMOVED:  None. IMPLANTS:  Arsen plate and screws and Arsen 4.0 mm cannulated screws x3. ESTIMATED BLOOD LOSS:  Less than 5 mL. DRAINS:  None. FINDINGS:  Closed displaced trimalleolar ankle fracture. TOURNIQUET TIME:  HemaClear calf tourniquet for 100 minutes. INDICATIONS OF PROCEDURE:  This is a 40-year-old female who suffered a ground-level fall and a closed trimalleolar ankle fracture that was unstable. She was treated by my partner, Dr. Pranay Valdovinos initially with closed reduction and external fixation. She had a large fracture blister at the lateral ankle. I saw the patient in the office and reviewed her x-rays and examined her ankle. This is an unstable injury and recommended surgical fixation to promote healing, restore anatomy and function and allow for early weightbearing and motion and also to potentially prevent nonunion, malunion, and post-traumatic osteoarthritis of the ankle. I offered both conservative and surgical management options to the patient.   I discussed the risks of surgery which include but are not limited to complications of anesthesia including death, pain, bleeding, infection, damage to surrounding structures, nonunion, malunion, DVT, PE, wound healing problems, post-traumatic osteoarthritis, continued pain and stiffness and the need for further surgery. The patient verbalized understanding and elected to proceed with surgical intervention. DESCRIPTION OF PROCEDURE:  The correct patient, extremity, and operation were all identified in the preoperative holding area and I marked her right ankle. The Anesthesia team provided regional block and a pain catheter and she was taken back to the operating room, placed under general anesthesia in the floppy lateral decubitus position and secured to the table with safety strap and beanbag. A nonsterile well-padded upper thigh tourniquet was placed and the right external fixator on the right leg was then removed. The right lower extremity was prepped and draped in usual sterile fashion. A preop time-out was called. Again, the correct patient, extremity, and operation were all identified, all parties were in agreement and we proceeded with the operation. The patient received IV antibiotics within 30 minutes of the incision. A longitudinal incision was made centered over the distal fibula and sharp dissection was carried down to the level of the skin and the superficial peroneal nerve was identified, mobilized, and retracted. The fracture was identified and curetted of intervening hematoma and periosteum. Blunt dissection was then carried out posteriorly back to the posterior malleolus. Attention was then turned to the medial ankle and a longitudinal incision was made centered over the medial malleolus. Sharp dissection was carried down to the level of skin and blunt dissection carried down to the level of the bone taking care to protect the saphenous vein and nerve. The periosteum was elevated off the posterior aspect of the distal fibula and the care to the fracture was then further identified. There was a large posterior fracture fragment of the posterior malleolus and involved approximately 20-25% of the joint surface.   This involved the posterolateral aspect of the distal tibia. There was also a separate fragment that involved the posteromedial aspect of the distal tibia and wrapped around to the posterior one-third of the medial malleolus. Working through both of these incisions both fractures were then freed up and the fractures were copiously irrigated to remove the small bony fragments. The posterolateral fracture was then reduced and the posteromedial fracture was reduced and both fractures were held in place with two separate reduction clamps. Fluoroscopic images showed anatomic reduction of the fractures in the lateral plane. Through a small anterior incision on the distal tibia, blunt dissection was carried down to the level of the bone to protect the neurovascular bundle and the extensor digitorum longus tendons. With direct visualization of the bone and using the tissue protector, a K-wire was placed from anterior to posterior, capturing the posterolateral posterior malleolus fragment. This wire was then measured and overdrilled and a 4.0-mm cannulated screw was placed over the wire and secured with a satisfactory purchase in the bone and satisfactory position with fluoroscopic imaging. Two more screws were placed through the medial incision from the anterior tibia to the posterior tibia capturing this fragment and also capturing the posteromedial fragment. These wires were placed with satisfactory position using lateral imaging and measured and overdrilled. Two more cannulated screws were placed for a total of three screws going from anterior to posterior and the distal tibia capturing the posterolateral and posteromedial fracture fragments of the distal tibia. Fluoroscopic imaging and lateral view with dorsiflexion and plantarflexion of the ankle showed stability of the fracture without the clamps in place and maintained anatomic congruence of the ankle joint.     The posteromedial fracture fragment was then further affixed to the bone with an anti-glide plate placed with three screws in the distal tibia. This fracture was anatomically reduced prior to placing the plate and screws. Attention was then turned to the lateral malleolus fracture and this fracture was then reduced in anatomic position and held in place with reduction clamps. Fluoroscopic images showed anatomic reduction of the lateral malleolus fracture and restoration of fibular length. A lag screw was placed perpendicular to the fracture in the usual fashion after drilling and measuring. There was excellent purchase of the screw. The lag screw and the clamp was removed and the fracture remained stable. A lateral neutralization plate was then affixed to the distal tibia with four locking screws in the distal fragment and three bicortical screws in the proximal fragment. External rotation stress view and Cotton testing showed no abnormal medial clear space or syndesmosis widening. Final fluoroscopic images of the right ankle were taken, reviewed and independently interpreted by me intraoperatively to show anatomic reduction of the fracture and satisfactory placement of the hardware. The wounds were then copiously irrigated with normal saline and closed in layers. A sterile dressing was applied. The tourniquet was dropped and a well-padded, well-molded short leg splint was applied to the right lower extremity and the patient was awakened from general anesthesia. During the procedure, Merrick Keith PA-C performed the duties of assistance with positioning, retraction, implant and limb management as well as closure, dressing and splint application. Prior to starting the procedure, the right lower extremity was exsanguinated with a HemaClear tourniquet and this was used on the calf for approximately 100 minutes. The patient was then transported to the recovery room in hemodynamically stable condition.   All lap and sharp counts were correct at the end of the case. POSTOPERATIVE CARE:  The patient will be discharged back to her facility once she meets PACU criteria. She will be nonweightbearing to the right lower extremity and she will start aspirin 81 mg p.o. b.i.d. for DVT prophylaxis on the morning of postop day #1. She will follow up with Jacinda Langston in the [address] [place] [address] office in 2 weeks as scheduled.       Wilma Desir MD      [id number]  D:  01/19/2023 16:13  T:  01/19/2023 22:36  JOB #:  [id number]

## 2025-07-18 NOTE — ED ADULT NURSE NOTE - PAIN RATING/NUMBER SCALE (0-10): ACTIVITY
Medication refilled per protocol  Last seen: 4/25        BP Readings from Last 2 Encounters:   06/10/25 120/80   05/27/25 (!) 129/92     Pulse Readings from Last 2 Encounters:   06/10/25 88   05/27/25 83          Labs:  Hemoglobin A1C (%)   Date Value   05/11/2023 7.2 (H)     Last Point of Care A1C:  Hemoglobin A1C, POC (%)   Date Value   06/10/2025 6.8 (A)     Hemoglobin A1C POC (%)   Date Value   05/16/2019 6.9     LDL (mg/dL)   Date Value   04/18/2024 20     Cholesterol (mg/dL)   Date Value   04/18/2024 81     HDL (mg/dL)   Date Value   04/18/2024 40     Potassium (mmol/L)   Date Value   04/22/2025 4.1     Sodium (mmol/L)   Date Value   04/22/2025 139     Glomerular Filtration Rate (no units)   Date Value   04/22/2025 87     HCT (%)   Date Value   04/22/2025 43.1     TSH (mcUnits/mL)   Date Value   02/07/2019 2.237      Microalbumin/ Creatinine Ratio (mg/g)   Date Value   07/15/2021 7.3        10

## (undated) DEVICE — CANISTER, RIGID, 3000CC: Brand: MEDLINE INDUSTRIES, INC.

## (undated) DEVICE — SUTURE VCRL SZ 0 L36IN ABSRB UD L36MM CT-1 1/2 CIR J946H

## (undated) DEVICE — Device

## (undated) DEVICE — EXTREMITY-SFMCASU: Brand: MEDLINE INDUSTRIES, INC.

## (undated) DEVICE — CANNULATED SCREW
Type: IMPLANTABLE DEVICE | Site: FOOT | Status: NON-FUNCTIONAL
Brand: ASNIS
Removed: 2023-01-19

## (undated) DEVICE — BANDAGE COMPR W6INXL10YD ST M E WHITE/BEIGE

## (undated) DEVICE — SPEEDGUIDE DRILL AO: Brand: VARIAX

## (undated) DEVICE — GLOVE ORTHO 8   MSG9480

## (undated) DEVICE — PADDING CAST SPEC 6INX4YD COT --

## (undated) DEVICE — PADDING CAST W6INXL4YD ST COT RAYON MICROPLEATED HIGHLY

## (undated) DEVICE — DRAPE C-ARMOUR C-ARM KIT --

## (undated) DEVICE — GLOVE SURG SZ 85 L12IN FNGR THK79MIL GRN LTX FREE

## (undated) DEVICE — SUTURE MCRYL SZ 3-0 L27IN ABSRB UD L19MM PS-2 3/8 CIR PRIM Y427H

## (undated) DEVICE — GLOVE ORANGE PI 7 1/2   MSG9075

## (undated) DEVICE — GOWN,SIRUS,FABRNF,XL,20/CS: Brand: MEDLINE

## (undated) DEVICE — GLOVE SURG SZ 8 L12IN FNGR THK79MIL GRN LTX FREE

## (undated) DEVICE — GLOVE ORTHO 7 1/2   MSG9475

## (undated) DEVICE — SPONGE GZ W4XL4IN COT 12 PLY TYP VII WVN C FLD DSGN STERILE

## (undated) DEVICE — UNTHREADED GUIDE WIRE: Brand: FIXOS

## (undated) DEVICE — PAD,ABDOMINAL,5"X9",ST,LF,25/BX: Brand: MEDLINE INDUSTRIES, INC.

## (undated) DEVICE — DRESSING,GAUZE,XEROFORM,CURAD,5"X9",ST: Brand: CURAD

## (undated) DEVICE — GLOVE ORANGE PI 8   MSG9080

## (undated) DEVICE — OVERDRILL AO, DIA3.5MM X 122MM: Brand: VARIAX

## (undated) DEVICE — SUTURE NONABSORBABLE MONOFILAMENT 3-0 PS-1 18 IN BLK ETHILON 1663H

## (undated) DEVICE — CANNULATED DRILL